# Patient Record
Sex: FEMALE | Race: WHITE | NOT HISPANIC OR LATINO | Employment: OTHER | URBAN - METROPOLITAN AREA
[De-identification: names, ages, dates, MRNs, and addresses within clinical notes are randomized per-mention and may not be internally consistent; named-entity substitution may affect disease eponyms.]

---

## 2017-08-16 NOTE — PATIENT DISCUSSION
ASTEROID HYALOSIS APPEARS STABLE OD. NO HOLES OR TEARS 360'OU. FLOATERS AND FLASHES SYMPTOMS REVIEWED WITH PATIENT. RETURN FOR FOLLOW-UP AS SCHEDULED.

## 2017-08-16 NOTE — PATIENT DISCUSSION
Continue: Systane (peg 400-propylene glycol): drops: 0.4-0.3% 1 drop twice a day as needed into both eyes

## 2017-08-16 NOTE — PATIENT DISCUSSION
New Prescription: PreserVision AREDS 2 (vit c,t-un-drgkt-lutein-zeaxan): capsule: 739-857-96-1 mg-unit-mg-mg 1 capsule twice a day by mouth

## 2017-10-13 NOTE — PATIENT DISCUSSION
Continue: PreserVision AREDS 2 (vit c,y-wo-nnqme-lutein-zeaxan): capsule: 532-598-71-3 mg-unit-mg-mg 1 capsule twice a day by mouth

## 2017-11-15 NOTE — PATIENT DISCUSSION
Continue: PreserVision AREDS 2 (vit c,s-pr-oxgyo-lutein-zeaxan): capsule: 876-279-14-2 mg-unit-mg-mg 1 capsule twice a day by mouth

## 2018-06-18 NOTE — PATIENT DISCUSSION
Continue: PreserVision AREDS 2 (vit c,d-jw-omsuf-lutein-zeaxan): capsule: 914-693-48-8 mg-unit-mg-mg 1 capsule twice a day by mouth

## 2018-06-18 NOTE — PATIENT DISCUSSION
Asteroid Hyalosis Counseling: The vitreous is a highly hydrated gel-like substance that provides structural support to the eyeball while providing a clear unobstructed path for light to reach the retina. The vitreous is an extracellular matrix that is composed of collagen fibrils. With age, it may partially liquefy and opacities may form in the clear vitreous.

## 2018-11-14 NOTE — PATIENT DISCUSSION
Continue: PreserVision AREDS 2 (vit c,f-pi-jqvcc-lutein-zeaxan): capsule: 156-303-74-6 mg-unit-mg-mg 1 capsule twice a day by mouth

## 2018-11-14 NOTE — PATIENT DISCUSSION
RETINA IS ATTACHED OU: PVD OU; ASTEROID HYALOSIS OS;NO HOLES OR TEARS SEEN ON DILATED EXAM TODAY.  RETINAL DETACHMENT SIGNS AND SYMPTOMS REVIEWED

## 2019-06-27 NOTE — PATIENT DISCUSSION
Continue: PreserVision AREDS 2 (vit c,i-az-ldorc-lutein-zeaxan): capsule: 863-330-66-6 mg-unit-mg-mg 1 capsule twice a day by mouth

## 2019-06-27 NOTE — PATIENT DISCUSSION
GLAUCOMA SUSPECT, OU :  POSITIVE FAMILY HISTORY, C/D ASYMMETRY,  AND INCREASED THINNING ON OCT OS. RETURN FOR FOLLOW UP AS SCHEDULED.

## 2019-06-27 NOTE — PATIENT DISCUSSION
MILD DRY EYE, OU: PRESCRIBED REFRESH ARTIFICIAL TEARS PRN OU. RECOMMENDS OMEGA-3 FISH OIL WITH [facility] APPROVAL. RETURN FOR FOLLOW-UP AS SCHEDULED OR SOONER IF SYMPTOMS WORSEN.

## 2019-07-05 NOTE — PATIENT DISCUSSION
Continue: PreserVision AREDS 2 (vit c,p-xy-jdfqp-lutein-zeaxan): capsule: 444-244-51-0 mg-unit-mg-mg 1 capsule twice a day by mouth

## 2019-11-13 NOTE — PATIENT DISCUSSION
Continue: PreserVision AREDS 2 (vit c,f-tu-lwmcv-lutein-zeaxan): capsule: 402-521-62-6 mg-unit-mg-mg 1 capsule twice a day by mouth

## 2019-11-13 NOTE — PATIENT DISCUSSION
RETINA IS ATTACHED OU: ASTEROID HYALOSIS OD; PVD OU; NO HOLES OR TEARS SEEN ON DILATED EXAM TODAY.  RETINAL DETACHMENT SIGNS AND SYMPTOMS REVIEWED

## 2020-01-06 NOTE — PATIENT DISCUSSION
Continue: PreserVision AREDS 2 (vit c,y-ct-oliuz-lutein-zeaxan): capsule: 190-527-19-7 mg-unit-mg-mg 1 capsule twice a day by mouth

## 2020-07-07 NOTE — PATIENT DISCUSSION
Continue: PreserVision AREDS 2 (vit c,a-cw-qhhjm-lutein-zeaxan): capsule: 813-545-75-5 mg-unit-mg-mg 1 capsule twice a day by mouth

## 2020-07-13 NOTE — PATIENT DISCUSSION
AMD (DRY), OU:  PRESCRIBE AREDS 2 VITAMINS AND RECOMMEND UV PROTECTION. PATIENT IS AWARE OF AMSLER GRID AND HOW TO CHECK FOR PROGRESSION. SMOKING AVOIDANCE ADVISED. FOLLOW WITH DR. Brina Sequeira AS SCHEDULED.

## 2020-07-13 NOTE — PATIENT DISCUSSION
ASTEROID HYALOSIS APPEARS STABLE OD: NO HOLES OR TEARS 360'OU. FLOATERS AND FLASHES SYMPTOMS REVIEWED WITH PATIENT. RETURN FOR FOLLOW-UP AS SCHEDULED.

## 2020-07-13 NOTE — PATIENT DISCUSSION
EPIRETINAL MEMBRANE, OD: MINIMAL VISUAL SIGNIFICANCE TO THE PATIENT AT THIS TIME. FOLLOW WITH DR. Amberly Wong AS SCHEDULED.

## 2020-11-25 NOTE — PATIENT DISCUSSION
EARLY NON-EXUDATIVE DRY AMD OU: NOT NECESSARY FOR PATIENT TO TAKE AREDS 2 VITAMINS AT THIS TIME. RECOMMEND HOME MONITORING OF VISION WITH AMSLER GRID AND USE OF UV PROTECTION. SMOKING AVOIDANCE REVIEWED. RETURN FOR FOLLOW-UP AS SCHEDULED.

## 2020-11-25 NOTE — PATIENT DISCUSSION
Continue: PreserVision AREDS 2 (vit c,e-qd-bezxv-lutein-zeaxan): capsule: 959-955-11-2 mg-unit-mg-mg 1 capsule twice a day by mouth

## 2021-07-14 NOTE — PATIENT DISCUSSION
Continue: PreserVision AREDS 2 (vit c,x-mn-xcsyr-lutein-zeaxan): capsule: 693-530-37-6 mg-unit-mg-mg 1 capsule twice a day by mouth

## 2021-07-14 NOTE — PATIENT DISCUSSION
Epiretinal Membrane Counseling: The diagnosis and natural history of epiretinal membrane was discussed with the patient including the risk of progression with retinal traction resulting in visual distortion. Patient instructed on how to do [address] to check for distortion in vision, The patient is instructed to call back immediately if any vision changes, and keep follow up as scheduled.

## 2022-02-09 ENCOUNTER — PREPPED CHART (OUTPATIENT)
Dept: URBAN - METROPOLITAN AREA CLINIC 10 | Facility: CLINIC | Age: 37
End: 2022-02-09

## 2022-02-09 ENCOUNTER — ESTABLISHED PATIENT (OUTPATIENT)
Dept: URBAN - METROPOLITAN AREA CLINIC 10 | Facility: CLINIC | Age: 37
End: 2022-02-09

## 2022-02-09 DIAGNOSIS — H52.03: ICD-10-CM

## 2022-02-09 PROCEDURE — 92015 DETERMINE REFRACTIVE STATE: CPT

## 2022-02-09 PROCEDURE — 92310L CONTACT LENS

## 2022-02-09 PROCEDURE — 92014 COMPRE OPH EXAM EST PT 1/>: CPT

## 2022-02-09 ASSESSMENT — KERATOMETRY
OD_K2POWER_DIOPTERS: 42.50
OD_K1POWER_DIOPTERS: 42.00
OS_K2POWER_DIOPTERS: 42.25
OS_K1POWER_DIOPTERS: 41.75
OD_AXISANGLE2_DEGREES: 155
OS_AXISANGLE_DEGREES: 115
OS_AXISANGLE2_DEGREES: 25
OD_AXISANGLE_DEGREES: 065

## 2022-02-09 ASSESSMENT — TONOMETRY
OD_IOP_MMHG: 13
OS_IOP_MMHG: 12

## 2022-02-09 ASSESSMENT — VISUAL ACUITY
OS_SC: 20/30-1
OS_CC: 20/25
OD_CC: 20/25
OD_SC: 20/60
OU_SC: 20/30
OU_CC: 20/20-1

## 2022-07-07 RX ORDER — MELOXICAM 15 MG/1
TABLET ORAL
COMMUNITY

## 2022-07-07 RX ORDER — ONDANSETRON 8 MG/1
TABLET, ORALLY DISINTEGRATING ORAL
COMMUNITY
Start: 2022-03-28

## 2022-07-12 PROBLEM — J32.9 SINUSITIS: Status: ACTIVE | Noted: 2022-07-12

## 2022-07-12 PROBLEM — G43.109 OCULAR MIGRAINE: Status: ACTIVE | Noted: 2022-07-12

## 2022-07-12 PROBLEM — R20.2 PARESTHESIA: Status: ACTIVE | Noted: 2022-07-12

## 2022-07-12 PROBLEM — M22.2X2 PATELLOFEMORAL ARTHRALGIA OF BOTH KNEES: Status: ACTIVE | Noted: 2022-07-12

## 2022-07-12 PROBLEM — J30.9 ALLERGIC RHINITIS: Status: ACTIVE | Noted: 2022-07-12

## 2022-07-12 PROBLEM — M22.2X1 PATELLOFEMORAL ARTHRALGIA OF BOTH KNEES: Status: ACTIVE | Noted: 2022-07-12

## 2022-07-12 PROBLEM — D32.0 BENIGN NEOPLASM OF CEREBRAL MENINGES (HCC): Status: ACTIVE | Noted: 2022-07-12

## 2022-08-11 PROBLEM — J32.9 SINUSITIS: Status: RESOLVED | Noted: 2022-07-12 | Resolved: 2022-08-11

## 2023-03-22 NOTE — PATIENT DISCUSSION
Lens Treatment: [FreeTextEntry3] : Date of Service: 03/22/2023 \par \par Account: [id number] \par \par Patient: CHRISS BRIZUELA \par \par YOB: 1952 \par \par Age: 70 year \par \par \par Surgeon:                                           Ciro Rodarte M.D.\par \par Assistant:                                          None.\par \par Pre-Operative Diagnosis:              Lumbosacral Radiculitis (M54.17)     \par \par Post Operative Diagnosis:            Lumbosacral Radiculitis (M54.17)\par \par Procedure:                                       Left L4-5, L5-S1 transforaminal epidural steroid injection under fluoroscopic guidance.\par \par Anesthesia:                                      MAC\par \par \par This procedure was carried out using fluoroscopic guidance.  The risks and benefits of the procedure were discussed extensively with the patient.  The consent of the patient was obtained and the following procedure was performed. The patient was placed in the prone position on the fluoroscopic table and the lumbar area was prepped and draped in a sterile fashion.\par \par The left L4-5 and L5-S1 neural foramen were identified on left oblique  "mirza dog" anatomical view at the 6 o' clock position using fluoroscopic guidance, and the area was marked. The overlying skin and subcutaneous structures were anesthetized using sterile technique with 1% Lidocaine.  A 22 gauge spinal needle was directed toward the inferior (6 o'clock) position of the pedicle, which formed the roof of the identified foramens.  Once in the epidural space, after negative aspiration for heme and CSF, 1cc of Omnipaque contrast was injected to confirm epidural location and assess filling defects and rule out intravascular needle placement. \par \par The following contrast flow and epidurogram was observed: no intravascular or intrathecal flow pattern was noted.  No blood or CSF was aspirated. Omnipaque spread appeared to outline the left L4 and L5 nerve roots and spread medially into the epidural space.  \par \par After this, an injectate of 3 cc preservative free normal saline plus 40 mg of depo- Medrol was injected in the epidural space at each of the two levels.\par \par The needle was subsequently removed.  Vital signs remained normal.  Pulse oximeter was used throughout the procedure and the patient's pulse and oxygen saturation remained within normal limits.  The patient tolerated the procedure well.  There were no complications.  The patient was instructed to apply ice over the injection sites for twenty minutes every two hours for the next 24 to 48 hours.  The patient was also instructed to contact me immediately if there were any problems.\par \debbie Rodarte M.D.\debbie  no

## 2023-09-11 ENCOUNTER — EMERGENCY VISIT (OUTPATIENT)
Dept: URBAN - METROPOLITAN AREA CLINIC 10 | Facility: CLINIC | Age: 38
End: 2023-09-11

## 2023-09-11 DIAGNOSIS — H20.00: ICD-10-CM

## 2023-09-11 PROCEDURE — 99213 OFFICE O/P EST LOW 20 MIN: CPT

## 2023-09-11 ASSESSMENT — KERATOMETRY
OD_K1POWER_DIOPTERS: 42.00
OD_AXISANGLE2_DEGREES: 165
OS_K2POWER_DIOPTERS: 42.50
OS_K1POWER_DIOPTERS: 42.00
OS_AXISANGLE_DEGREES: 120
OS_AXISANGLE2_DEGREES: 30
OD_K2POWER_DIOPTERS: 42.75
OD_AXISANGLE_DEGREES: 75

## 2023-09-11 ASSESSMENT — VISUAL ACUITY
OD_CC: 20/20-1
OS_CC: 20/20-1

## 2024-03-20 ENCOUNTER — ESTABLISHED PATIENT (OUTPATIENT)
Dept: URBAN - METROPOLITAN AREA CLINIC 10 | Facility: CLINIC | Age: 39
End: 2024-03-20

## 2024-03-20 DIAGNOSIS — H52.03: ICD-10-CM

## 2024-03-20 PROCEDURE — 92014 COMPRE OPH EXAM EST PT 1/>: CPT

## 2024-03-20 PROCEDURE — 92015 DETERMINE REFRACTIVE STATE: CPT

## 2024-03-20 ASSESSMENT — KERATOMETRY
OS_AXISANGLE_DEGREES: 111
OD_AXISANGLE_DEGREES: 69
OS_K1POWER_DIOPTERS: 41.75
OS_AXISANGLE2_DEGREES: 21
OD_K2POWER_DIOPTERS: 42.50
OD_AXISANGLE2_DEGREES: 159
OS_K2POWER_DIOPTERS: 42.25
OD_K1POWER_DIOPTERS: 41.75

## 2024-03-20 ASSESSMENT — VISUAL ACUITY
OD_CC: 20/30
OU_CC: 20/25
OS_CC: 20/25

## 2024-03-20 ASSESSMENT — TONOMETRY
OD_IOP_MMHG: 15
OS_IOP_MMHG: 12

## 2025-01-07 NOTE — PATIENT DISCUSSION
AMD (Dry) Counseling:  I have instructed the patient to take an AREDS 2 vitamin mixture to minimize the risk of disease progression. The importance of daily monitoring with Amsler grid was emphasized and an Amsler grid was provided if the patient did not have one. The patient was advised to call the office if new symptoms of persistent blurring or distortion of vision arise as evaluation and possible treatment is necessary to preserve as much vision as possible. Return for follow-up as scheduled. no

## 2025-03-26 ENCOUNTER — COMPREHENSIVE EXAM (OUTPATIENT)
Age: 40
End: 2025-03-26

## 2025-03-26 DIAGNOSIS — H52.03: ICD-10-CM

## 2025-03-26 PROCEDURE — 92015 DETERMINE REFRACTIVE STATE: CPT

## 2025-03-26 PROCEDURE — 92014 COMPRE OPH EXAM EST PT 1/>: CPT
